# Patient Record
Sex: FEMALE | Race: WHITE | NOT HISPANIC OR LATINO | Employment: FULL TIME | URBAN - METROPOLITAN AREA
[De-identification: names, ages, dates, MRNs, and addresses within clinical notes are randomized per-mention and may not be internally consistent; named-entity substitution may affect disease eponyms.]

---

## 2023-04-13 ENCOUNTER — LAB VISIT (OUTPATIENT)
Dept: LAB | Facility: HOSPITAL | Age: 38
End: 2023-04-13
Payer: COMMERCIAL

## 2023-04-13 DIAGNOSIS — R53.83 FATIGUE, UNSPECIFIED TYPE: ICD-10-CM

## 2023-04-13 DIAGNOSIS — R06.09 DYSPNEA ON EXERTION: ICD-10-CM

## 2023-04-13 DIAGNOSIS — R07.89 OTHER CHEST PAIN: ICD-10-CM

## 2023-04-13 DIAGNOSIS — R06.02 SHORTNESS OF BREATH: ICD-10-CM

## 2023-04-13 DIAGNOSIS — R42 DIZZINESS AND GIDDINESS: Primary | ICD-10-CM

## 2023-04-13 LAB
ALBUMIN SERPL-MCNC: 3.9 G/DL (ref 3.5–5)
ALBUMIN/GLOB SERPL: 1.3 RATIO (ref 1.1–2)
ALP SERPL-CCNC: 74 UNIT/L (ref 40–150)
ALT SERPL-CCNC: 9 UNIT/L (ref 0–55)
AST SERPL-CCNC: 14 UNIT/L (ref 5–34)
BILIRUBIN DIRECT+TOT PNL SERPL-MCNC: 0.6 MG/DL
BNP BLD-MCNC: 10.5 PG/ML
BUN SERPL-MCNC: 21 MG/DL (ref 7–18.7)
CALCIUM SERPL-MCNC: 9.8 MG/DL (ref 8.4–10.2)
CHLORIDE SERPL-SCNC: 105 MMOL/L (ref 98–107)
CO2 SERPL-SCNC: 23 MMOL/L (ref 22–29)
CREAT SERPL-MCNC: 0.82 MG/DL (ref 0.55–1.02)
CRP SERPL HS-MCNC: 1.76 MG/L
ERYTHROCYTE [DISTWIDTH] IN BLOOD BY AUTOMATED COUNT: 12.5 % (ref 11.5–17)
ERYTHROCYTE [SEDIMENTATION RATE] IN BLOOD: 3 MM/HR (ref 0–20)
GFR SERPLBLD CREATININE-BSD FMLA CKD-EPI: >60 MLS/MIN/1.73/M2
GLOBULIN SER-MCNC: 3.1 GM/DL (ref 2.4–3.5)
GLUCOSE SERPL-MCNC: 88 MG/DL (ref 74–100)
HCT VFR BLD AUTO: 42.6 % (ref 37–47)
HGB BLD-MCNC: 14.4 G/DL (ref 12–16)
MCH RBC QN AUTO: 29.9 PG (ref 27–31)
MCHC RBC AUTO-ENTMCNC: 33.8 G/DL (ref 33–36)
MCV RBC AUTO: 88.4 FL (ref 80–94)
NRBC BLD AUTO-RTO: 0 %
PLATELET # BLD AUTO: 374 X10(3)/MCL (ref 130–400)
PMV BLD AUTO: 9.2 FL (ref 7.4–10.4)
POTASSIUM SERPL-SCNC: 4.2 MMOL/L (ref 3.5–5.1)
PROT SERPL-MCNC: 7 GM/DL (ref 6.4–8.3)
RBC # BLD AUTO: 4.82 X10(6)/MCL (ref 4.2–5.4)
SODIUM SERPL-SCNC: 137 MMOL/L (ref 136–145)
WBC # SPEC AUTO: 7 X10(3)/MCL (ref 4.5–11.5)

## 2023-04-13 PROCEDURE — 36415 COLL VENOUS BLD VENIPUNCTURE: CPT

## 2023-04-13 PROCEDURE — 85027 COMPLETE CBC AUTOMATED: CPT

## 2023-04-13 PROCEDURE — 83880 ASSAY OF NATRIURETIC PEPTIDE: CPT

## 2023-04-13 PROCEDURE — 85651 RBC SED RATE NONAUTOMATED: CPT

## 2023-04-13 PROCEDURE — 86225 DNA ANTIBODY NATIVE: CPT

## 2023-04-13 PROCEDURE — 80053 COMPREHEN METABOLIC PANEL: CPT

## 2023-04-13 PROCEDURE — 86141 C-REACTIVE PROTEIN HS: CPT

## 2023-04-15 LAB
ANTINUCLEAR ANTIBODY SCREEN (OHS): NEGATIVE
DSDNA ANTIBODY (OHS): NEGATIVE

## 2023-09-08 ENCOUNTER — LAB VISIT (OUTPATIENT)
Dept: LAB | Facility: HOSPITAL | Age: 38
End: 2023-09-08
Attending: INTERNAL MEDICINE
Payer: COMMERCIAL

## 2023-09-08 DIAGNOSIS — I50.9 HEART FAILURE, UNSPECIFIED HF CHRONICITY, UNSPECIFIED HEART FAILURE TYPE: Primary | ICD-10-CM

## 2023-09-08 DIAGNOSIS — R60.9 EDEMA, UNSPECIFIED TYPE: ICD-10-CM

## 2023-09-08 LAB
ANION GAP SERPL CALC-SCNC: 6 MEQ/L
BUN SERPL-MCNC: 17.9 MG/DL (ref 7–18.7)
CALCIUM SERPL-MCNC: 9.7 MG/DL (ref 8.4–10.2)
CHLORIDE SERPL-SCNC: 107 MMOL/L (ref 98–107)
CO2 SERPL-SCNC: 25 MMOL/L (ref 22–29)
CREAT SERPL-MCNC: 0.75 MG/DL (ref 0.55–1.02)
CREAT/UREA NIT SERPL: 24
GFR SERPLBLD CREATININE-BSD FMLA CKD-EPI: >60 MLS/MIN/1.73/M2
GLUCOSE SERPL-MCNC: 84 MG/DL (ref 74–100)
POTASSIUM SERPL-SCNC: 4.2 MMOL/L (ref 3.5–5.1)
SODIUM SERPL-SCNC: 138 MMOL/L (ref 136–145)

## 2023-09-08 PROCEDURE — 36415 COLL VENOUS BLD VENIPUNCTURE: CPT

## 2023-09-08 PROCEDURE — 80048 BASIC METABOLIC PNL TOTAL CA: CPT

## 2025-01-28 ENCOUNTER — OFFICE VISIT (OUTPATIENT)
Dept: URGENT CARE | Facility: CLINIC | Age: 40
End: 2025-01-28
Payer: COMMERCIAL

## 2025-01-28 VITALS
RESPIRATION RATE: 16 BRPM | SYSTOLIC BLOOD PRESSURE: 123 MMHG | OXYGEN SATURATION: 99 % | TEMPERATURE: 98 F | HEIGHT: 65 IN | WEIGHT: 227 LBS | DIASTOLIC BLOOD PRESSURE: 85 MMHG | BODY MASS INDEX: 37.82 KG/M2 | HEART RATE: 105 BPM

## 2025-01-28 DIAGNOSIS — J10.1 INFLUENZA A: Primary | ICD-10-CM

## 2025-01-28 LAB
CTP QC/QA: YES
CTP QC/QA: YES
FLUAV AG NPH QL: POSITIVE
FLUBV AG NPH QL: NEGATIVE
MOLECULAR STREP A: NEGATIVE

## 2025-01-28 PROCEDURE — 87651 STREP A DNA AMP PROBE: CPT | Mod: QW,,,

## 2025-01-28 PROCEDURE — 99203 OFFICE O/P NEW LOW 30 MIN: CPT | Mod: 25,,,

## 2025-01-28 PROCEDURE — 87804 INFLUENZA ASSAY W/OPTIC: CPT | Mod: QW,,,

## 2025-01-28 RX ORDER — FUROSEMIDE 20 MG/1
20 TABLET ORAL
COMMUNITY
Start: 2025-01-06

## 2025-01-28 RX ORDER — BALOXAVIR MARBOXIL 80 MG/1
80 TABLET, FILM COATED ORAL ONCE
Qty: 1 TABLET | Refills: 0 | Status: SHIPPED | OUTPATIENT
Start: 2025-01-28 | End: 2025-01-28

## 2025-01-28 RX ORDER — FLUTICASONE PROPIONATE 50 MCG
1 SPRAY, SUSPENSION (ML) NASAL
COMMUNITY
Start: 2024-09-24

## 2025-01-28 RX ORDER — OSELTAMIVIR PHOSPHATE 75 MG/1
75 CAPSULE ORAL 2 TIMES DAILY
Qty: 10 CAPSULE | Refills: 0 | Status: SHIPPED | OUTPATIENT
Start: 2025-01-28 | End: 2025-02-02

## 2025-01-28 RX ORDER — BENAZEPRIL HYDROCHLORIDE 20 MG/1
20 TABLET ORAL
COMMUNITY
Start: 2024-11-14

## 2025-01-28 NOTE — PROGRESS NOTES
"Subjective:      Patient ID: Dawna Bro is a 39 y.o. female.    Vitals:  height is 5' 5" (1.651 m) and weight is 103 kg (227 lb). Her oral temperature is 98.3 °F (36.8 °C). Her blood pressure is 123/85 and her pulse is 105. Her respiration is 16 and oxygen saturation is 99%.     Chief Complaint: Sinus Problem     Patient is a 39 y.o. female who presents to urgent care with complaints of cough, sinus congestion, fever, colored mucus, chest congestion, SOB, and sore throat x1.5 days. Alleviating factors include Nyquil, Tylenol, and Motrin with moderate amount of relief. Patient denies neck stiffness, rash, vomiting, diarrhea, or stomach/abdominal pain.  She does report her daughter was sick with similar symptoms last week however she was tested and was flu negative, she does believe they may have been tested too soon.    ROS   Objective:     Physical Exam   Constitutional: She is oriented to person, place, and time. She appears well-developed. She is cooperative.  Non-toxic appearance. She does not appear ill. No distress.   HENT:   Head: Normocephalic and atraumatic.   Ears:   Right Ear: Hearing, tympanic membrane, external ear and ear canal normal.   Left Ear: Hearing, tympanic membrane, external ear and ear canal normal.   Nose: Congestion present. No mucosal edema, rhinorrhea or nasal deformity. No epistaxis. Right sinus exhibits no maxillary sinus tenderness and no frontal sinus tenderness. Left sinus exhibits no maxillary sinus tenderness and no frontal sinus tenderness.   Mouth/Throat: Uvula is midline and mucous membranes are normal. Mucous membranes are moist. No trismus in the jaw. Normal dentition. No uvula swelling. Posterior oropharyngeal erythema present. No oropharyngeal exudate or posterior oropharyngeal edema.   Eyes: Conjunctivae and lids are normal. No scleral icterus.   Neck: Trachea normal and phonation normal. Neck supple. No edema present. No erythema present. No neck rigidity " present.   Cardiovascular: Normal rate, regular rhythm, normal heart sounds and normal pulses.   Pulmonary/Chest: Effort normal and breath sounds normal. No respiratory distress. She has no decreased breath sounds. She has no wheezes. She has no rhonchi. She has no rales.   Abdominal: Normal appearance.   Musculoskeletal: Normal range of motion.         General: No deformity. Normal range of motion.   Neurological: She is alert and oriented to person, place, and time. She exhibits normal muscle tone. Coordination normal.   Skin: Skin is warm, dry, intact, not diaphoretic and not pale.   Psychiatric: Her speech is normal and behavior is normal. Judgment and thought content normal.   Nursing note and vitals reviewed.      Assessment:     1. Influenza A        Plan:       Influenza A  -     POCT Strep A, Molecular  -     POCT Influenza A/B Rapid Antigen  -     oseltamivir (TAMIFLU) 75 MG capsule; Take 1 capsule (75 mg total) by mouth 2 (two) times daily. for 5 days  Dispense: 10 capsule; Refill: 0  -     baloxavir marboxiL (XOFLUZA) 80 mg tablet; Take 1 tablet (80 mg total) by mouth once. for 1 dose  Dispense: 1 tablet; Refill: 0    Flu A positive, strep negative  Take either Tamiflu or Xofluza, do not take both.  You may search for the  coupon for Xofluza on your phone.  You may present this to the pharmacy.  Drink plenty of fluids. Get plenty of rest.   Claritin, Zyrtec, or other over-the-counter antihistamine for runny nose, postnasal drip, nasal congestion.  Nasal spray such as Nasacort or Flonase for congestion.  Over-the-counter cough medication as needed and as directed.  Over-the-counter decongestants such as Sudafed, phenylephrine or pseudoephedrine.  Avoid these if you have a history of high blood pressure.  Warm saltwater gargles for sore throat.  Warm water with honey to help coat the throat.  Throat lozenges.  Chloraseptic spray for worsening sore throat.  Tylenol or ibuprofen as needed for  sore throat and fever.  May alternate every 3 hours    Call or return to clinic as needed   Go to the ER with any significant change or worsening of symptoms.   Follow up with your primary care doctor.

## 2025-01-28 NOTE — PATIENT INSTRUCTIONS
As discussed remain home until your fever free for 24 hours without the use of medicine and symptoms improving.    Flu A positive, strep negative  Take either Tamiflu or Xofluza, do not take both.  You may search for the  coupon for Xofluza on your phone.  You may present this to the pharmacy.  Drink plenty of fluids. Get plenty of rest.   Claritin, Zyrtec, or other over-the-counter antihistamine for runny nose, postnasal drip, nasal congestion.  Nasal spray such as Nasacort or Flonase for congestion.  Over-the-counter cough medication as needed and as directed.  Over-the-counter decongestants such as Sudafed, phenylephrine or pseudoephedrine.  Avoid these if you have a history of high blood pressure.  Warm saltwater gargles for sore throat.  Warm water with honey to help coat the throat.  Throat lozenges.  Chloraseptic spray for worsening sore throat.  Tylenol or ibuprofen as needed for sore throat and fever.  May alternate every 3 hours    Call or return to clinic as needed   Go to the ER with any significant change or worsening of symptoms.   Follow up with your primary care doctor.

## 2025-02-26 ENCOUNTER — LAB VISIT (OUTPATIENT)
Dept: LAB | Facility: HOSPITAL | Age: 40
End: 2025-02-26
Attending: INTERNAL MEDICINE
Payer: COMMERCIAL

## 2025-02-26 DIAGNOSIS — Z79.899 ENCOUNTER FOR LONG-TERM (CURRENT) USE OF MEDICATIONS: Primary | ICD-10-CM

## 2025-02-26 DIAGNOSIS — R60.9 EDEMA, UNSPECIFIED TYPE: ICD-10-CM

## 2025-02-26 DIAGNOSIS — I10 ESSENTIAL HYPERTENSION, MALIGNANT: ICD-10-CM

## 2025-02-26 DIAGNOSIS — R00.0 TACHYCARDIA, UNSPECIFIED: ICD-10-CM

## 2025-02-26 DIAGNOSIS — I50.9 HEART FAILURE, UNSPECIFIED HF CHRONICITY, UNSPECIFIED HEART FAILURE TYPE: ICD-10-CM

## 2025-02-26 LAB
ALBUMIN SERPL-MCNC: 3.7 G/DL (ref 3.5–5)
ALBUMIN/GLOB SERPL: 1.1 RATIO (ref 1.1–2)
ALP SERPL-CCNC: 90 UNIT/L (ref 40–150)
ALT SERPL-CCNC: 30 UNIT/L (ref 0–55)
ANION GAP SERPL CALC-SCNC: 8 MEQ/L
AST SERPL-CCNC: 32 UNIT/L (ref 5–34)
BILIRUB SERPL-MCNC: 0.6 MG/DL
BUN SERPL-MCNC: 16.5 MG/DL (ref 7–18.7)
CALCIUM SERPL-MCNC: 9.2 MG/DL (ref 8.4–10.2)
CHLORIDE SERPL-SCNC: 106 MMOL/L (ref 98–107)
CHOLEST SERPL-MCNC: 204 MG/DL
CHOLEST/HDLC SERPL: 3 {RATIO} (ref 0–5)
CO2 SERPL-SCNC: 24 MMOL/L (ref 22–29)
CREAT SERPL-MCNC: 0.81 MG/DL (ref 0.55–1.02)
CREAT/UREA NIT SERPL: 20
ERYTHROCYTE [DISTWIDTH] IN BLOOD BY AUTOMATED COUNT: 12.7 % (ref 11.5–17)
GFR SERPLBLD CREATININE-BSD FMLA CKD-EPI: >60 ML/MIN/1.73/M2
GLOBULIN SER-MCNC: 3.5 GM/DL (ref 2.4–3.5)
GLUCOSE SERPL-MCNC: 100 MG/DL (ref 74–100)
HCT VFR BLD AUTO: 41.4 % (ref 37–47)
HDLC SERPL-MCNC: 62 MG/DL (ref 35–60)
HGB BLD-MCNC: 14.1 G/DL (ref 12–16)
LDLC SERPL CALC-MCNC: 126 MG/DL (ref 50–140)
MCH RBC QN AUTO: 29.6 PG (ref 27–31)
MCHC RBC AUTO-ENTMCNC: 34.1 G/DL (ref 33–36)
MCV RBC AUTO: 86.8 FL (ref 80–94)
NRBC BLD AUTO-RTO: 0 %
PLATELET # BLD AUTO: 382 X10(3)/MCL (ref 130–400)
PMV BLD AUTO: 9.2 FL (ref 7.4–10.4)
POTASSIUM SERPL-SCNC: 4.1 MMOL/L (ref 3.5–5.1)
PROT SERPL-MCNC: 7.2 GM/DL (ref 6.4–8.3)
RBC # BLD AUTO: 4.77 X10(6)/MCL (ref 4.2–5.4)
SODIUM SERPL-SCNC: 138 MMOL/L (ref 136–145)
TRIGL SERPL-MCNC: 78 MG/DL (ref 37–140)
TSH SERPL-ACNC: 3.51 UIU/ML (ref 0.35–4.94)
VLDLC SERPL CALC-MCNC: 16 MG/DL
WBC # BLD AUTO: 7.4 X10(3)/MCL (ref 4.5–11.5)

## 2025-02-26 PROCEDURE — 80061 LIPID PANEL: CPT

## 2025-02-26 PROCEDURE — 85027 COMPLETE CBC AUTOMATED: CPT

## 2025-02-26 PROCEDURE — 84443 ASSAY THYROID STIM HORMONE: CPT

## 2025-02-26 PROCEDURE — 80053 COMPREHEN METABOLIC PANEL: CPT

## 2025-02-26 PROCEDURE — 36415 COLL VENOUS BLD VENIPUNCTURE: CPT

## 2025-05-27 ENCOUNTER — OFFICE VISIT (OUTPATIENT)
Dept: URGENT CARE | Facility: CLINIC | Age: 40
End: 2025-05-27
Payer: COMMERCIAL

## 2025-05-27 VITALS
DIASTOLIC BLOOD PRESSURE: 85 MMHG | SYSTOLIC BLOOD PRESSURE: 139 MMHG | OXYGEN SATURATION: 99 % | RESPIRATION RATE: 20 BRPM | TEMPERATURE: 98 F | BODY MASS INDEX: 37.43 KG/M2 | HEIGHT: 65 IN | WEIGHT: 224.63 LBS | HEART RATE: 89 BPM

## 2025-05-27 DIAGNOSIS — M54.50 ACUTE RIGHT-SIDED LOW BACK PAIN WITHOUT SCIATICA: Primary | ICD-10-CM

## 2025-05-27 LAB
BILIRUB UR QL STRIP: NEGATIVE
GLUCOSE UR QL STRIP: NEGATIVE
KETONES UR QL STRIP: NEGATIVE
LEUKOCYTE ESTERASE UR QL STRIP: NEGATIVE
PH, POC UA: 6
POC BLOOD, URINE: NEGATIVE
POC NITRATES, URINE: NEGATIVE
PROT UR QL STRIP: NEGATIVE
SP GR UR STRIP: 1.01 (ref 1–1.03)
UROBILINOGEN UR STRIP-ACNC: 0.1 (ref 0.1–1.1)

## 2025-05-27 PROCEDURE — 99213 OFFICE O/P EST LOW 20 MIN: CPT | Mod: 25,,, | Performed by: FAMILY MEDICINE

## 2025-05-27 PROCEDURE — 96372 THER/PROPH/DIAG INJ SC/IM: CPT | Mod: ,,, | Performed by: FAMILY MEDICINE

## 2025-05-27 PROCEDURE — 81003 URINALYSIS AUTO W/O SCOPE: CPT | Mod: QW,,, | Performed by: FAMILY MEDICINE

## 2025-05-27 RX ORDER — PREDNISONE 10 MG/1
30 TABLET ORAL DAILY
Qty: 15 TABLET | Refills: 0 | Status: SHIPPED | OUTPATIENT
Start: 2025-05-27 | End: 2025-06-01

## 2025-05-27 RX ORDER — CYCLOBENZAPRINE HCL 10 MG
10 TABLET ORAL 3 TIMES DAILY PRN
Qty: 12 TABLET | Refills: 0 | Status: SHIPPED | OUTPATIENT
Start: 2025-05-27 | End: 2025-06-06

## 2025-05-27 RX ORDER — DEXAMETHASONE SODIUM PHOSPHATE 10 MG/ML
10 INJECTION INTRAMUSCULAR; INTRAVENOUS
Status: COMPLETED | OUTPATIENT
Start: 2025-05-27 | End: 2025-05-27

## 2025-05-27 RX ORDER — LIFITEGRAST 50 MG/ML
1 SOLUTION/ DROPS OPHTHALMIC 2 TIMES DAILY
COMMUNITY
Start: 2025-05-10

## 2025-05-27 RX ADMIN — DEXAMETHASONE SODIUM PHOSPHATE 10 MG: 10 INJECTION INTRAMUSCULAR; INTRAVENOUS at 08:05

## 2025-05-27 NOTE — PATIENT INSTRUCTIONS
Urinalysis does not indicate infection.  Medication sent to pharmacy. Take medications as prescribed.  Muscle relaxer may make you sleepy.  Do not drive or drink alcohol while taking this medication.   Try heat or ice, apply for 10-20 minutes at a time several times a day. Put a thin cloth between the heat or ice and your skin.  Gently rub or massage the area to relieve pain and help with blood flow.  Do not do anything that makes the pain worse.   Begin to slowly do range of motion exercises as tolerated.  Seek immediate medical care if you develop numbness, tingling, unable to move extremities.  You have new or worsening pain or weakness, If you lose bowel or bladder function or if you are not getting better as expected.

## 2025-05-27 NOTE — PROGRESS NOTES
"Subjective:      Patient ID: Dawna Bro is a 40 y.o. female.    Vitals:  height is 5' 5" (1.651 m) and weight is 101.9 kg (224 lb 9.6 oz). Her oral temperature is 98.1 °F (36.7 °C). Her blood pressure is 139/85 and her pulse is 89. Her respiration is 20 and oxygen saturation is 99%.     Chief Complaint: Back Pain     Patient is a 40 y.o. female who presents to urgent care with complaints of right sided midback pain x1 months. Alleviating factors include tylenol, hot packs, ice packs with no relief.  She says about 1 month ago she had bilateral lower back pain that is slowly begin to resolve on the left side, but not the right.  Pain is intermittent worse with walking and while sleeping.  Pain is made worse switching positions while lying down at night.  Patient denies known mechanism of injury, urinary frequency, dysuria, hematuria.  Denies history of kidney stones.   Patient does not exhibit any of the following:  Recent trauma, unexplained recent weight loss, fever, immunosuppression, steroid use, denies IV drug use, syncope, history of cancer, weakness, bowel or bladder dysfunction , spinal surgery within the last year, recent bacterial infections, denies taking any biologic medications          Constitution: Negative for chills, sweating, fatigue and fever.   HENT: Negative.     Neck: neck negative.   Cardiovascular: Negative.    Eyes: Negative.    Respiratory: Negative.     Gastrointestinal:  Negative for abdominal trauma, abdominal pain, abdominal bloating, nausea, vomiting, constipation and diarrhea.   Endocrine: negative.   Genitourinary:  Negative for dysuria, frequency, urgency, flank pain and hematuria.   Musculoskeletal:  Positive for pain, back pain and muscle ache. Negative for trauma.   Skin: Negative.  Negative for erythema.   Allergic/Immunologic: Negative.    Neurological:  Negative for disorientation and altered mental status.   Hematologic/Lymphatic: Negative.  "   Psychiatric/Behavioral:  Negative for altered mental status, disorientation and confusion.       Objective:     Physical Exam   Constitutional: She is oriented to person, place, and time. She appears well-developed. She is cooperative. No distress.   HENT:   Head: Normocephalic and atraumatic.   Nose: Nose normal.   Mouth/Throat: Oropharynx is clear and moist and mucous membranes are normal.   Eyes: Conjunctivae and lids are normal.   Neck: Trachea normal and phonation normal. Neck supple.   Cardiovascular: Normal rate, regular rhythm, normal heart sounds and normal pulses.   Pulmonary/Chest: Effort normal and breath sounds normal.   Abdominal: Normal appearance and bowel sounds are normal. She exhibits no mass. Soft.   Musculoskeletal:         General: No deformity.      Thoracic back: Normal.      Lumbar back: She exhibits normal range of motion, no tenderness, no bony tenderness, no swelling, no edema, no deformity and no spasm.   Neurological: She is alert and oriented to person, place, and time. She has normal strength and normal reflexes. No sensory deficit.   Skin: Skin is warm, dry, intact, not diaphoretic and not pale. No bruising and No erythema no jaundice  Psychiatric: Her speech is normal and behavior is normal. Judgment and thought content normal.   Nursing note and vitals reviewed.         Previous History      Review of patient's allergies indicates:   Allergen Reactions    Azithromycin Other (See Comments)     Pt states it is an intolerance.       Past Medical History:   Diagnosis Date    Heart failure, unspecified     Hypertension      Current Outpatient Medications   Medication Instructions    benazepriL (LOTENSIN) 20 mg    cyclobenzaprine (FLEXERIL) 10 mg, Oral, 3 times daily PRN    fluticasone propionate (FLONASE) 50 mcg/actuation nasal spray 1 spray    furosemide (LASIX) 20 mg    predniSONE (DELTASONE) 30 mg, Oral, Daily    XIIDRA 5 % Dpet 1 drop, 2 times daily     Past Surgical History:  "  Procedure Laterality Date    CLAVICLE SURGERY Left     EYE SURGERY Right     lazy eye    HYSTERECTOMY      SPINE SURGERY      c-spine    TONSILLECTOMY      WISDOM TOOTH EXTRACTION       Family History   Problem Relation Name Age of Onset    Hypertension Father      Heart failure Father      Cancer Father         Social History[1]     Physical Exam      Vital Signs Reviewed   /85 (BP Location: Right arm, Patient Position: Sitting)   Pulse 89   Temp 98.1 °F (36.7 °C) (Oral)   Resp 20   Ht 5' 5" (1.651 m)   Wt 101.9 kg (224 lb 9.6 oz)   SpO2 99%   BMI 37.38 kg/m²        Procedures    Procedures     Labs     Results for orders placed or performed in visit on 05/27/25   POCT Urinalysis, Dipstick, Manual, W/O Scope    Collection Time: 05/27/25  8:34 AM   Result Value Ref Range    POC Blood, Urine Negative Negative    POC Bilirubin, Urine Negative Negative    POC Urobilinogen, Urine 0.1 0.1 - 1.1    POC Ketones, Urine Negative Negative    POC Protein, Urine Negative Negative    POC Nitrates, Urine Negative Negative    POC Glucose, Urine Negative Negative    pH, UA 6     POC Specific Gravity, Urine 1.015 1.003 - 1.029    POC Leukocytes, Urine Negative Negative      Assessment:     1. Acute right-sided low back pain without sciatica        Plan:   Urinalysis does not indicate infection.  Medication sent to pharmacy. Take medications as prescribed.  Muscle relaxer may make you sleepy.  Do not drive or drink alcohol while taking this medication.   Try heat or ice, apply for 10-20 minutes at a time several times a day. Put a thin cloth between the heat or ice and your skin.  Gently rub or massage the area to relieve pain and help with blood flow.  Do not do anything that makes the pain worse.   Begin to slowly do range of motion exercises as tolerated.  Seek immediate medical care if you develop numbness, tingling, unable to move extremities.  You have new or worsening pain or weakness, If you lose bowel or " bladder function or if you are not getting better as expected.      Acute right-sided low back pain without sciatica  -     POCT Urinalysis, Dipstick, Manual, W/O Scope    Other orders  -     dexAMETHasone injection 10 mg  -     predniSONE (DELTASONE) 10 MG tablet; Take 3 tablets (30 mg total) by mouth once daily. for 5 days  Dispense: 15 tablet; Refill: 0  -     cyclobenzaprine (FLEXERIL) 10 MG tablet; Take 1 tablet (10 mg total) by mouth 3 (three) times daily as needed for Muscle spasms.  Dispense: 12 tablet; Refill: 0                         [1]   Social History  Tobacco Use    Smoking status: Never    Smokeless tobacco: Never   Substance Use Topics    Alcohol use: Never    Drug use: Never